# Patient Record
Sex: FEMALE | URBAN - METROPOLITAN AREA
[De-identification: names, ages, dates, MRNs, and addresses within clinical notes are randomized per-mention and may not be internally consistent; named-entity substitution may affect disease eponyms.]

---

## 2017-06-01 ENCOUNTER — APPOINTMENT (RX ONLY)
Dept: URBAN - METROPOLITAN AREA CLINIC 361 | Facility: CLINIC | Age: 52
Setting detail: DERMATOLOGY
End: 2017-06-01

## 2017-06-01 DIAGNOSIS — L40.0 PSORIASIS VULGARIS: ICD-10-CM

## 2017-06-01 DIAGNOSIS — R52 PAIN, UNSPECIFIED: ICD-10-CM

## 2017-06-01 PROBLEM — L29.8 OTHER PRURITUS: Status: ACTIVE | Noted: 2017-06-01

## 2017-06-01 PROBLEM — I10 ESSENTIAL (PRIMARY) HYPERTENSION: Status: ACTIVE | Noted: 2017-06-01

## 2017-06-01 PROBLEM — L85.3 XEROSIS CUTIS: Status: ACTIVE | Noted: 2017-06-01

## 2017-06-01 PROBLEM — E78.5 HYPERLIPIDEMIA, UNSPECIFIED: Status: ACTIVE | Noted: 2017-06-01

## 2017-06-01 PROCEDURE — ? COUNSELING

## 2017-06-01 PROCEDURE — 99202 OFFICE O/P NEW SF 15 MIN: CPT

## 2017-06-01 PROCEDURE — ? TREATMENT REGIMEN

## 2017-06-01 PROCEDURE — ? PRESCRIPTION

## 2017-06-01 RX ORDER — CALCIPOTRIENE AND BETAMETHASONE DIPROPIONATE 50; .5 UG/G; MG/G
SUSPENSION TOPICAL
Qty: 1 | Refills: 2 | Status: ERX | COMMUNITY
Start: 2017-06-01

## 2017-06-01 RX ORDER — FLUOCINOLONE ACETONIDE 0.11 MG/ML
OIL TOPICAL
Qty: 1 | Refills: 1 | Status: ERX | COMMUNITY
Start: 2017-06-01

## 2017-06-01 RX ADMIN — FLUOCINOLONE ACETONIDE: 0.11 OIL TOPICAL at 18:45

## 2017-06-01 RX ADMIN — CALCIPOTRIENE AND BETAMETHASONE DIPROPIONATE: 50; .5 SUSPENSION TOPICAL at 18:45

## 2017-06-01 ASSESSMENT — LOCATION SIMPLE DESCRIPTION DERM: LOCATION SIMPLE: LEFT PRETIBIAL REGION

## 2017-06-01 ASSESSMENT — PAIN INTENSITY VAS: HOW INTENSE IS YOUR PAIN 0 BEING NO PAIN, 10 BEING THE MOST SEVERE PAIN POSSIBLE?: NO PAIN

## 2017-06-01 ASSESSMENT — LOCATION DETAILED DESCRIPTION DERM: LOCATION DETAILED: LEFT DISTAL PRETIBIAL REGION

## 2017-06-01 ASSESSMENT — LOCATION ZONE DERM: LOCATION ZONE: LEG

## 2017-06-01 NOTE — PROCEDURE: TREATMENT REGIMEN
Hold Regimen: Discussed UVB Xtrac treatments.\\nDiscussed calcipotriene ointment \\nDiscussed biologics such as Humira, Stelara, and or Otezla

## 2017-06-01 NOTE — PROCEDURE: TREATMENT REGIMEN
Initiate Regimen: Dermasmoothe scalp oil apply in scalp at bedtime wear shower cap and shampoo in the morning.\\nTaclonex solution apply to left shin bid. Start Regimen: Dermasmoothe scalp oil apply in scalp at bedtime wear shower cap and shampoo in the morning.\\nTaclonex solution apply to left shin bid.

## 2017-06-01 NOTE — HPI: RASH (PSORIASIS)
How Severe Is Your Psoriasis?: severe
Do You Have A Family History Of Psoriasis?: yes
Is This A New Presentation, Or A Follow-Up?: Psoriasis
Additional History: Pt states she was prescribed a topical elsewhere that was effective on the right knee and left shin. Pt states the rash recurred only on the left shin.

## 2017-06-02 RX ORDER — CALCIPOTRIENE AND BETAMETHASONE DIPROPIONATE 50; .5 UG/G; MG/G
SUSPENSION TOPICAL
Qty: 1 | Refills: 2 | Status: ERX

## 2017-06-02 RX ORDER — FLUOCINOLONE ACETONIDE 0.11 MG/ML
OIL TOPICAL
Qty: 1 | Refills: 1 | Status: ERX